# Patient Record
Sex: FEMALE | URBAN - METROPOLITAN AREA
[De-identification: names, ages, dates, MRNs, and addresses within clinical notes are randomized per-mention and may not be internally consistent; named-entity substitution may affect disease eponyms.]

---

## 2017-02-13 ENCOUNTER — APPOINTMENT (OUTPATIENT)
Age: 27
Setting detail: DERMATOLOGY
End: 2017-02-14

## 2017-02-13 DIAGNOSIS — L71.8 OTHER ROSACEA: ICD-10-CM

## 2017-02-13 PROBLEM — F32.9 MAJOR DEPRESSIVE DISORDER, SINGLE EPISODE, UNSPECIFIED: Status: ACTIVE | Noted: 2017-02-13

## 2017-02-13 PROCEDURE — 99213 OFFICE O/P EST LOW 20 MIN: CPT

## 2017-02-13 PROCEDURE — OTHER COUNSELING: OTHER

## 2017-02-13 PROCEDURE — OTHER TREATMENT REGIMEN: OTHER

## 2017-02-13 PROCEDURE — OTHER FOLLOW UP FOR NEXT VISIT: OTHER

## 2017-02-13 ASSESSMENT — LOCATION ZONE DERM: LOCATION ZONE: FACE

## 2017-02-13 ASSESSMENT — LOCATION DETAILED DESCRIPTION DERM
LOCATION DETAILED: RIGHT INFERIOR CENTRAL MALAR CHEEK
LOCATION DETAILED: LEFT MEDIAL MALAR CHEEK
LOCATION DETAILED: LEFT MEDIAL FOREHEAD

## 2017-02-13 ASSESSMENT — LOCATION SIMPLE DESCRIPTION DERM
LOCATION SIMPLE: RIGHT CHEEK
LOCATION SIMPLE: LEFT CHEEK
LOCATION SIMPLE: LEFT FOREHEAD

## 2017-02-13 NOTE — PROCEDURE: TREATMENT REGIMEN
Continue Regimen: Soolantra qhs
Detail Level: Simple
Plan: Overall there is fairly good control with some breakthrough so I think adding a simple topical medication would benefit. If she does completely stop the oral antibiotic and has a lot a flareup we will need to consider other treatment options
Samples Given: Aczone 7.5% QAM. She has use this in the past with little benefit however I wonder if this may work as she continues the Soolantra if not we will consider metronidazole or a sulfa-based wash
Discontinue Regimen: She has nearly stopped the Minocycline  and will do so this week

## 2017-02-13 NOTE — PROCEDURE: FOLLOW UP FOR NEXT VISIT
Instructions (Optional): Call to check in with patient. She can call sooner if there are any problems
Detail Level: Detailed
Scheduled For Follow Up In (Optional): 2 mos